# Patient Record
Sex: FEMALE | URBAN - METROPOLITAN AREA
[De-identification: names, ages, dates, MRNs, and addresses within clinical notes are randomized per-mention and may not be internally consistent; named-entity substitution may affect disease eponyms.]

---

## 2023-03-22 ENCOUNTER — TELEPHONE (OUTPATIENT)
Age: 38
End: 2023-03-22

## 2023-03-22 NOTE — TELEPHONE ENCOUNTER
Left voicemail message for Ms. Pierre to contact our office to schedule an appointment per PCP referral for gallstones.

## 2023-03-27 ENCOUNTER — TELEPHONE (OUTPATIENT)
Age: 38
End: 2023-03-27

## 2023-03-27 NOTE — TELEPHONE ENCOUNTER
Left voicemail message to contact our office to schedule an appointment with  for an evaluation of gallstones.

## 2023-05-16 ENCOUNTER — TELEPHONE (OUTPATIENT)
Age: 38
End: 2023-05-16

## 2023-05-16 NOTE — TELEPHONE ENCOUNTER
Left voicemail message to contact our office to schedule an appointment per [facility] and [facility] referral for an evaluation of cholelithiasis/bariatric surgery.